# Patient Record
Sex: MALE | Race: BLACK OR AFRICAN AMERICAN
[De-identification: names, ages, dates, MRNs, and addresses within clinical notes are randomized per-mention and may not be internally consistent; named-entity substitution may affect disease eponyms.]

---

## 2019-09-29 ENCOUNTER — HOSPITAL ENCOUNTER (INPATIENT)
Dept: HOSPITAL 74 - YASAS | Age: 54
LOS: 5 days | Discharge: HOME | DRG: 897 | End: 2019-10-04
Attending: SURGERY | Admitting: SURGERY
Payer: COMMERCIAL

## 2019-09-29 VITALS — BODY MASS INDEX: 23.7 KG/M2

## 2019-09-29 DIAGNOSIS — Z85.72: ICD-10-CM

## 2019-09-29 DIAGNOSIS — I10: ICD-10-CM

## 2019-09-29 DIAGNOSIS — Z79.84: ICD-10-CM

## 2019-09-29 DIAGNOSIS — F17.210: ICD-10-CM

## 2019-09-29 DIAGNOSIS — Z21: ICD-10-CM

## 2019-09-29 DIAGNOSIS — R94.5: ICD-10-CM

## 2019-09-29 DIAGNOSIS — I42.9: ICD-10-CM

## 2019-09-29 DIAGNOSIS — R63.4: ICD-10-CM

## 2019-09-29 DIAGNOSIS — E86.0: ICD-10-CM

## 2019-09-29 DIAGNOSIS — E11.9: ICD-10-CM

## 2019-09-29 DIAGNOSIS — R00.0: ICD-10-CM

## 2019-09-29 DIAGNOSIS — D72.819: ICD-10-CM

## 2019-09-29 DIAGNOSIS — F10.230: Primary | ICD-10-CM

## 2019-09-29 PROCEDURE — HZ2ZZZZ DETOXIFICATION SERVICES FOR SUBSTANCE ABUSE TREATMENT: ICD-10-PCS | Performed by: ALLERGY & IMMUNOLOGY

## 2019-09-29 RX ADMIN — Medication SCH MG: at 22:14

## 2019-09-29 RX ADMIN — ATORVASTATIN CALCIUM SCH MG: 10 TABLET, FILM COATED ORAL at 22:14

## 2019-09-29 RX ADMIN — NICOTINE SCH MG: 21 PATCH TRANSDERMAL at 14:22

## 2019-09-29 NOTE — HP
CIWA Score


Nausea/Vomitin


Muscle Tremors: 2


Anxiety: 2


Agitation: 3


Paroxysmal Sweats: 2


Orientation: 0-Oriented


Tacttile Disturbances: 1-Very Mild Itch/Numbness


Auditory Disturbances: 0-None


Visual Disturbances: 0-None


Headache: 2-Mild


CIWA-Ar Total Score: 14





- Admission Criteria


[facility] Guidelines: Admission for Medically Managed Detox: 


Requires at least one of the followin. CIWA greater than 12


2. Seizures within the past 24 hours


3. Delirium tremens within the past 24 hours


4. Hallucinations within the past 24 hours


5. Acute intervention needed for co  occurring medical disorder


6. Acute intervention needed for co  occurring psychiatric disorder


7. Severe withdrawal that cannot be handled at a lower level of care (continued


    vomiting, continued diarrhea, abnormal vital signs) requiring intravenous


    medication and/or fluids


8. Pregnancy








Admission ROS S





- \[facility]\""


Chief Complaint: 





i am here to stop drinking alcohol


Allergies/Adverse Reactions: 


 Allergies











Allergy/AdvReac Type Severity Reaction Status Date / Time


 


No Known Allergies Allergy   Verified 19 10:42











History of Present Illness: 





this 53 years old male with alcohol dependence,seeking detox,withdrawal symptom,

last admission [facility] in ,


history of hypertension,type 2 dm,


hiv since 


denied seizure


syncope


weight loss


nicotine dependence 10 cigarette


longest sobriety 10 years


plan for out patient program after detox


non hodgkin's lymphoma since  on chemotherapy ,remission


tachcardia,cadomyopathy





Exam Limitations: No Limitations





- Ebola screening


Have you traveled outside of the country in the last 21 days: No (N)


Have you had contact with anyone from an Ebola affected area: No





- Review of Systems


Constitutional: Loss of Appetite, Malaise, Night Sweats, Changes in sleep, 

Weakness


EENT: reports: Nose Congestion


Respiratory: reports: No Symptoms reported


Cardiac: reports: No Symptoms Reported


GI: reports: Nausea, Poor Appetite, Abdominal cramping


: reports: No Symptoms Reported


Musculoskeletal: reports: Back Pain, Muscle Pain


Integumentary: reports: Dryness


Neuro: reports: Headache, Tremors


Endocrine: reports: No Symptoms Reported


Hematology: reports: No Symptoms Reported, Other (hiv)


Psychiatric: reports: No Sypmtoms Reported, Judgement Intact, Mood/Affect 

Appropiate, Orientated x3


Other Systems: Reviewed and Negative





Patient History





- Patient Medical History


Hx Anemia: No


Hx Asthma: No


Hx Chronic Obstructive Pulmonary Disease (COPD): No


Hx Cancer: No


Hx Cardiac Disorders: No


Hx Congestive Heart Failure: No


Hx Hypertension: Yes (on med)


Hx Hypercholesterolemia: No


Hx Pacemaker: No


HX Cerebrovascular Accident: No


Hx Seizures: No


Hx Dementia: No


Hx Diabetes: Yes (type2 dm)


Hx Gastrointestinal Disorders: No


Hx Liver Disease: No


Hx Genitourinary Disorders: No


Hx Sexually Transmitted Disorders: No


Hx Renal Disease (ESRD): No


Hx Thyroid Disease: No


Hx Human Immunodeficiency Virus (HIV): Yes (since )


Hx Hepatitis C: No


Hx Depression: No


Hx Suicide Attempt: No


Hx Bipolar Disorder: No


Hx Schizophrenia: No


Other Medical History: no sucidal,no homicidal,non hodgekin's lymphoma





- Patient Surgical History


Other Surgical History: portacath right chest wall and removal of portacath





- PPD History


Previous Implant?: Yes


Documented Results: Positive w/o proof


PPD to be Administered?: No





- Smoking Cessation


Smoking history: Current every day smoker


Have you smoked in the past 12 months: Yes


Aproximately how many cigarettes per day: 10


Cigars Per Day: 0


Hx Chewing Tobacco Use: No


Initiated information on smoking cessation: Yes


'Breaking Loose' booklet given: 19





- Substance & Tx. History


Hx Alcohol Use: Yes


Hx Substance Use: No


Substance Use Type: Alcohol


Hx Substance Use Treatment: Yes ([facility] 2011)





- Substances abused


  ** Alcohol


Substance route: Oral


Frequency: Daily


Amount used: 2 PINTS OF VODKA


Age of first use: 9


Date of last use: 19





Admission Physical Exam [facility]





- Vital Signs


Vital Signs: 


 Vital Signs - 24 hr











  19





  10:34


 


Temperature 98.8 F


 


Pulse Rate 103 H


 


Respiratory 20





Rate 


 


Blood Pressure 185/112 H














- Physical


General Appearance: Yes: Moderate Distress, Tremorous, Irritable, Sweating, 

Anxious


HEENTM: Yes: Normal ENT Inspection, KEY, Pharynx Normal


Respiratory: Yes: Lungs Clear, Normal Breath Sounds, No Respiratory Distress


Neck: Yes: Within Normal Limits, Supple, Trachea in good position


Breast: Yes: Within Normal Limits


Cardiology: Yes: Within Normal Limits, Regular Rhythm, Regular Rate, S1, S2


Abdominal: Yes: Within Normal Limits, Normal Bowel Sounds, Non Tender, Flat, 

Soft


Genitourinary: Yes: Within Normal Limits


Back: Yes: Muscle Spasm


Musculoskeletal: Yes: Back pain, Muscle Pain


Extremities: Yes: Tremors


Neurological: Yes: CNs II-XII NML intact, Fully Oriented, Alert, Motor Strength 

5/5


Integumentary: Yes: Dry


Lymphatic: Yes: Within Normal Limits





- Diagnostic


(1) Alcohol dependence with uncomplicated withdrawal


Current Visit: Yes   Status: Acute   





(2) Dehydration


Current Visit: Yes   Status: Acute   





(3) Non-Hodgkin lymphoma in remission


Current Visit: Yes   Status: Acute   





(4) Nicotine dependence


Current Visit: Yes   Status: Acute   





(5) Weight loss


Current Visit: Yes   Status: Acute   





(6) DM2 (diabetes mellitus, type 2)


Current Visit: Yes   Status: Acute   





(7) HIV (human immunodeficiency virus infection)


Current Visit: Yes   Status: Acute   





(8) Cardiomyopathy


Current Visit: Yes   Status: Acute   





(9) Tachycardia


Current Visit: Yes   Status: Acute   





Cleared for Admission [facility]





- Detox or Rehab


[facility] Level of Care: Medically Managed


Detox Regimen/Protocol: Librium





Screened but not Admitted





- Documentation of Visit


Screened but not Admitted: No





Breathalyzer





- Breathalyzer


Breathalyzer: 0.011





Urine Drug Screen





- Test Device


Lot number: [id number]


Expiration date: 21





- Control


Is test valid?: Yes





- Results


Drug screen NEGATIVE: Yes


Urine drug screen results: BZO-Benzodiazepines





Inpatient Rehab Admission





- Rehab Decision to Admit


Inpatient rehab admission?: No

## 2019-09-30 LAB
ALBUMIN SERPL-MCNC: 3 G/DL (ref 3.4–5)
ALP SERPL-CCNC: 71 U/L (ref 45–117)
ALT SERPL-CCNC: 35 U/L (ref 13–61)
ANION GAP SERPL CALC-SCNC: 6 MMOL/L (ref 8–16)
APPEARANCE UR: CLEAR
AST SERPL-CCNC: 45 U/L (ref 15–37)
BILIRUB SERPL-MCNC: 0.5 MG/DL (ref 0.2–1)
BILIRUB UR STRIP.AUTO-MCNC: NEGATIVE MG/DL
BUN SERPL-MCNC: 8.9 MG/DL (ref 7–18)
CALCIUM SERPL-MCNC: 9.1 MG/DL (ref 8.5–10.1)
CHLORIDE SERPL-SCNC: 103 MMOL/L (ref 98–107)
CO2 SERPL-SCNC: 31 MMOL/L (ref 21–32)
COLOR UR: (no result)
CREAT SERPL-MCNC: 0.7 MG/DL (ref 0.55–1.3)
DEPRECATED RDW RBC AUTO: 14.1 % (ref 11.9–15.9)
GLUCOSE SERPL-MCNC: 93 MG/DL (ref 74–106)
HCT VFR BLD CALC: 38 % (ref 35.4–49)
HGB BLD-MCNC: 12.5 GM/DL (ref 11.7–16.9)
KETONES UR QL STRIP: NEGATIVE
LEUKOCYTE ESTERASE UR QL STRIP.AUTO: NEGATIVE
MCH RBC QN AUTO: 30.9 PG (ref 25.7–33.7)
MCHC RBC AUTO-ENTMCNC: 33 G/DL (ref 32–35.9)
MCV RBC: 93.6 FL (ref 80–96)
NITRITE UR QL STRIP: NEGATIVE
PH UR: 7.5 [PH] (ref 5–8)
PLATELET # BLD AUTO: 143 K/MM3 (ref 134–434)
PMV BLD: 9.6 FL (ref 7.5–11.1)
POTASSIUM SERPLBLD-SCNC: 3.5 MMOL/L (ref 3.5–5.1)
PROT SERPL-MCNC: 7.9 G/DL (ref 6.4–8.2)
PROT UR QL STRIP: (no result)
PROT UR QL STRIP: NEGATIVE
RBC # BLD AUTO: 4.06 M/MM3 (ref 4–5.6)
SODIUM SERPL-SCNC: 140 MMOL/L (ref 136–145)
SP GR UR: 1.02 (ref 1.01–1.03)
UROBILINOGEN UR STRIP-MCNC: 1 MG/DL (ref 0.2–1)
WBC # BLD AUTO: 2.9 K/MM3 (ref 4–10)

## 2019-09-30 RX ADMIN — CARVEDILOL SCH MG: 6.25 TABLET, FILM COATED ORAL at 10:23

## 2019-09-30 RX ADMIN — AMIODARONE HYDROCHLORIDE SCH MG: 200 TABLET ORAL at 10:22

## 2019-09-30 RX ADMIN — NICOTINE SCH MG: 21 PATCH TRANSDERMAL at 10:23

## 2019-09-30 RX ADMIN — Medication SCH TAB: at 10:22

## 2019-09-30 RX ADMIN — METFORMIN HYDROCHLORIDE SCH: 500 TABLET ORAL at 08:08

## 2019-09-30 RX ADMIN — ATORVASTATIN CALCIUM SCH MG: 10 TABLET, FILM COATED ORAL at 22:25

## 2019-09-30 RX ADMIN — LISINOPRIL SCH MG: 10 TABLET ORAL at 10:22

## 2019-09-30 RX ADMIN — APIXABAN SCH MG: 5 TABLET, FILM COATED ORAL at 10:58

## 2019-09-30 RX ADMIN — APIXABAN SCH MG: 5 TABLET, FILM COATED ORAL at 22:25

## 2019-09-30 RX ADMIN — Medication SCH MG: at 22:25

## 2019-09-30 RX ADMIN — CARVEDILOL SCH MG: 6.25 TABLET, FILM COATED ORAL at 22:25

## 2019-09-30 NOTE — EKG
Test Reason : 

Blood Pressure : ***/*** mmHG

Vent. Rate : 089 BPM     Atrial Rate : 089 BPM

   P-R Int : 184 ms          QRS Dur : 100 ms

    QT Int : 390 ms       P-R-T Axes : 022 082 061 degrees

   QTc Int : 474 ms

 

NORMAL SINUS RHYTHM

VOLTAGE CRITERIA FOR LEFT VENTRICULAR HYPERTROPHY

ABNORMAL ECG

NO PREVIOUS ECGS AVAILABLE

Confirmed by AUGUSTUS PARRA MD ([id number]) on 9/30/2019 1:25:34 PM

 

Referred By:             Confirmed By:AUGUSTUS PARRA MD

## 2019-09-30 NOTE — PN
S CIWA





- CIWA Score


Nausea/Vomitin-Mild Nausea/No Vomiting


Muscle Tremors: 3


Anxiety: 2


Agitation: 1-Slight > Activity


Paroxysmal Sweats: 2


Orientation: 0-Oriented


Tacttile Disturbances: 1-Very Mild Itch/Numbness


Auditory Disturbances: 1-Very Mild


Visual Disturbances: 0-None


Headache: 1-Very Mild


CIWA-Ar Total Score: 12





[facility] Progress Note (SOAP)


Subjective: 





doing well with librium detox regimen


sitting on the edge of the bed eating breakfast 


alert speech clearly coherently 


discuss frequency of eliquis


writer call [facility] pharmacy at [phone]


confirmed eliquis 5 mg po bid


modify frequency of eliquis 


Objective: 





19 10:14


 Vital Signs











Temperature  98 F   19 09:11


 


Pulse Rate  94 H  19 09:11


 


Respiratory Rate  18   19 09:11


 


Blood Pressure  135/78   19 09:11


 


O2 Sat by Pulse Oximetry (%)      








 Laboratory Last Values











POC Glucometer  113 UNITS ()   19  05:50    











19 10:14


lab pending


Assessment: 





19 10:14


alcohol withdrawal sx


Plan: 





continue libirum detox regmen

## 2019-10-01 RX ADMIN — CARVEDILOL SCH MG: 6.25 TABLET, FILM COATED ORAL at 10:09

## 2019-10-01 RX ADMIN — APIXABAN SCH MG: 5 TABLET, FILM COATED ORAL at 22:15

## 2019-10-01 RX ADMIN — APIXABAN SCH MG: 5 TABLET, FILM COATED ORAL at 10:08

## 2019-10-01 RX ADMIN — LISINOPRIL SCH MG: 10 TABLET ORAL at 10:09

## 2019-10-01 RX ADMIN — Medication SCH TAB: at 10:08

## 2019-10-01 RX ADMIN — CARVEDILOL SCH MG: 6.25 TABLET, FILM COATED ORAL at 22:15

## 2019-10-01 RX ADMIN — NICOTINE SCH MG: 21 PATCH TRANSDERMAL at 10:11

## 2019-10-01 RX ADMIN — METFORMIN HYDROCHLORIDE SCH MG: 500 TABLET ORAL at 06:29

## 2019-10-01 RX ADMIN — ATORVASTATIN CALCIUM SCH MG: 10 TABLET, FILM COATED ORAL at 22:15

## 2019-10-01 RX ADMIN — Medication SCH MG: at 22:15

## 2019-10-01 RX ADMIN — AMIODARONE HYDROCHLORIDE SCH MG: 200 TABLET ORAL at 10:08

## 2019-10-01 NOTE — PN
S CIWA





- CIWA Score


Nausea/Vomitin-Mild Nausea/No Vomiting


Muscle Tremors: 1-None Visible, but Felt


Anxiety: 2


Agitation: 2


Paroxysmal Sweats: No Perspiration


Orientation: 0-Oriented


Tacttile Disturbances: 1-Very Mild Itch/Numbness


Auditory Disturbances: 0-None


Visual Disturbances: 0-None


Headache: 1-Very Mild


CIWA-Ar Total Score: 8





[facility] Progress Note (SOAP)


Subjective: 





alert,irritable,anxious,interrupted sleep,tremor


Objective: 





10/01/19 13:28


 Vital Signs











Temperature  96 F L  10/01/19 09:10


 


Pulse Rate  92 H  10/01/19 09:10


 


Respiratory Rate  20   10/01/19 09:10


 


Blood Pressure  135/91   10/01/19 09:10


 


O2 Sat by Pulse Oximetry (%)      











10/01/19 13:28


 Laboratory Last Values











WBC  2.9 K/mm3 (4.0-10.0)  L  19  08:50    


 


RBC  4.06 M/mm3 (4.00-5.60)   19  08:50    


 


Hgb  12.5 GM/dL (11.7-16.9)   19  08:50    


 


Hct  38.0 % (35.4-49)   19  08:50    


 


MCV  93.6 fl (80-96)   19  08:50    


 


MCH  30.9 pg (25.7-33.7)   19  08:50    


 


MCHC  33.0 g/dl (32.0-35.9)   19  08:50    


 


RDW  14.1 % (11.9-15.9)   19  08:50    


 


Plt Count  143 K/MM3 (134-434)   19  08:50    


 


MPV  9.6 fl (7.5-11.1)   19  08:50    


 


Sodium  140 mmol/L (136-145)   19  08:50    


 


Potassium  3.5 mmol/L (3.5-5.1)   19  08:50    


 


Chloride  103 mmol/L ()   19  08:50    


 


Carbon Dioxide  31 mmol/L (21-32)   19  08:50    


 


Anion Gap  6 MMOL/L (8-16)  L  19  08:50    


 


BUN  8.9 mg/dL (7-18)   19  08:50    


 


Creatinine  0.7 mg/dL (0.55-1.3)   19  08:50    


 


Est GFR (CKD-EPI)AfAm  123.13   19  08:50    


 


Est GFR (CKD-EPI)NonAf  106.24   19  08:50    


 


POC Glucometer  140 UNITS ()   10/01/19  05:22    


 


Random Glucose  93 mg/dL ()   19  08:50    


 


Calcium  9.1 mg/dL (8.5-10.1)   19  08:50    


 


Total Bilirubin  0.5 mg/dL (0.2-1)   19  08:50    


 


AST  45 U/L (15-37)  H  19  08:50    


 


ALT  35 U/L (13-61)   19  08:50    


 


Alkaline Phosphatase  71 U/L ()   19  08:50    


 


Total Protein  7.9 g/dl (6.4-8.2)   19  08:50    


 


Albumin  3.0 g/dl (3.4-5.0)  L  19  08:50    


 


Urine Color  Dk yellow   19  14:07    


 


Urine Appearance  Clear   19  14:07    


 


Urine pH  7.5  (5.0-8.0)   19  14:07    


 


Ur Specific Gravity  1.018  (1.010-1.035)   19  14:07    


 


Urine Protein  Trace  (NEGATIVE)   19  14:07    


 


Urine Glucose (UA)  Negative  (NEGATIVE)   19  14:07    


 


Urine Ketones  Negative  (NEGATIVE)   19  14:07    


 


Urine Blood  Negative  (NEGATIVE)   19  14:07    


 


Urine Nitrite  Negative  (NEGATIVE)   19  14:07    


 


Urine Bilirubin  Negative  (NEGATIVE)   19  14:07    


 


Urine Urobilinogen  1.0 mg/dL (0.2-1.0)   19  14:07    


 


Ur Leukocyte Esterase  Negative  (NEGATIVE)   19  14:07    


 


RPR Titer  Nonreactive  (NONREACTIVE)   19  08:50    











Assessment: 





10/01/19 13:30


withdrawal symptom


Plan: 





continue detox,encourage fluid,wbc 2,900 probably due to Hiv

## 2019-10-02 RX ADMIN — APIXABAN SCH MG: 5 TABLET, FILM COATED ORAL at 22:12

## 2019-10-02 RX ADMIN — ATORVASTATIN CALCIUM SCH MG: 10 TABLET, FILM COATED ORAL at 22:12

## 2019-10-02 RX ADMIN — CARVEDILOL SCH MG: 6.25 TABLET, FILM COATED ORAL at 22:12

## 2019-10-02 RX ADMIN — Medication SCH MG: at 22:12

## 2019-10-02 RX ADMIN — LISINOPRIL SCH MG: 10 TABLET ORAL at 10:21

## 2019-10-02 RX ADMIN — METFORMIN HYDROCHLORIDE SCH MG: 500 TABLET ORAL at 06:05

## 2019-10-02 RX ADMIN — Medication PRN MG: at 22:13

## 2019-10-02 RX ADMIN — NICOTINE SCH MG: 21 PATCH TRANSDERMAL at 10:20

## 2019-10-02 RX ADMIN — APIXABAN SCH MG: 5 TABLET, FILM COATED ORAL at 10:21

## 2019-10-02 RX ADMIN — Medication SCH TAB: at 10:20

## 2019-10-02 RX ADMIN — CARVEDILOL SCH MG: 6.25 TABLET, FILM COATED ORAL at 10:21

## 2019-10-02 RX ADMIN — AMIODARONE HYDROCHLORIDE SCH MG: 200 TABLET ORAL at 10:21

## 2019-10-02 RX ADMIN — BICTEGRAVIR SODIUM, EMTRICITABINE, AND TENOFOVIR ALAFENAMIDE FUMARATE SCH EACH: 50; 200; 25 TABLET ORAL at 15:03

## 2019-10-02 NOTE — PN
[facility] CIWA





- CIWA Score


Nausea/Vomitin-No Nausea/No Vomiting


Muscle Tremors: 1-None Visible, but Felt


Anxiety: 2


Agitation: 2


Paroxysmal Sweats: No Perspiration


Orientation: 0-Oriented


Tacttile Disturbances: 1-Very Mild Itch/Numbness


Auditory Disturbances: 0-None


Visual Disturbances: 0-None


Headache: 1-Very Mild


CIWA-Ar Total Score: 7





[facility] Progress Note (SOAP)


Subjective: 





alert,irritable,anxious,interrupted sleep


Objective: 





10/02/19 13:56


 Vital Signs











Temperature  97.3 F L  10/02/19 13:04


 


Pulse Rate  93 H  10/02/19 13:04


 


Respiratory Rate  20   10/02/19 13:04


 


Blood Pressure  123/86   10/02/19 13:04


 


O2 Sat by Pulse Oximetry (%)      











Assessment: 





10/02/19 13:56


withdrawal symptom


Plan: 





continue detox librium regimen

## 2019-10-03 RX ADMIN — NICOTINE SCH MG: 21 PATCH TRANSDERMAL at 10:22

## 2019-10-03 RX ADMIN — Medication SCH MG: at 22:23

## 2019-10-03 RX ADMIN — APIXABAN SCH MG: 5 TABLET, FILM COATED ORAL at 10:19

## 2019-10-03 RX ADMIN — LISINOPRIL SCH MG: 10 TABLET ORAL at 10:19

## 2019-10-03 RX ADMIN — Medication PRN MG: at 22:23

## 2019-10-03 RX ADMIN — Medication SCH TAB: at 10:19

## 2019-10-03 RX ADMIN — METFORMIN HYDROCHLORIDE SCH MG: 500 TABLET ORAL at 06:27

## 2019-10-03 RX ADMIN — APIXABAN SCH MG: 5 TABLET, FILM COATED ORAL at 22:23

## 2019-10-03 RX ADMIN — CARVEDILOL SCH MG: 6.25 TABLET, FILM COATED ORAL at 10:19

## 2019-10-03 RX ADMIN — CARVEDILOL SCH MG: 6.25 TABLET, FILM COATED ORAL at 22:23

## 2019-10-03 RX ADMIN — AMIODARONE HYDROCHLORIDE SCH MG: 200 TABLET ORAL at 10:20

## 2019-10-03 RX ADMIN — ATORVASTATIN CALCIUM SCH MG: 10 TABLET, FILM COATED ORAL at 22:23

## 2019-10-03 RX ADMIN — BICTEGRAVIR SODIUM, EMTRICITABINE, AND TENOFOVIR ALAFENAMIDE FUMARATE SCH EACH: 50; 200; 25 TABLET ORAL at 10:19

## 2019-10-03 NOTE — PN
S CIWA





- CIWA Score


Nausea/Vomitin-No Nausea/No Vomiting


Muscle Tremors: None


Anxiety: 0-No Anxiety, at Ease


Agitation: 2


Paroxysmal Sweats: No Perspiration


Orientation: 0-Oriented


Tacttile Disturbances: 0-None


Auditory Disturbances: 0-None


Visual Disturbances: 0-None


Headache: 0-None Present


CIWA-Ar Total Score: 2





[facility] Progress Note (SOAP)


Subjective: 





Patient denies current Withdrawal / Detox symptoms and reports that he feels 

well overall at this time.


Objective: 


PATIENT A & O X 3, OBSERVED AMBULATING ON DETOX UNIT UNASSISTED. IN NO ACUTE 

DISTRESS.





10/03/19 15:20


 Vital Signs











Temperature  97.4 F L  10/03/19 13:12


 


Pulse Rate  82   10/03/19 13:12


 


Respiratory Rate  18   10/03/19 13:12


 


Blood Pressure  132/87   10/03/19 13:12


 


O2 Sat by Pulse Oximetry (%)      








 Laboratory Tests











  19





  13:33 16:46 05:50


 


WBC   


 


RBC   


 


Hgb   


 


Hct   


 


MCV   


 


MCH   


 


MCHC   


 


RDW   


 


Plt Count   


 


MPV   


 


Sodium   


 


Potassium   


 


Chloride   


 


Carbon Dioxide   


 


Anion Gap   


 


BUN   


 


Creatinine   


 


Est GFR (CKD-EPI)AfAm   


 


Est GFR (CKD-EPI)NonAf   


 


POC Glucometer  101  190  113


 


Random Glucose   


 


Calcium   


 


Total Bilirubin   


 


AST   


 


ALT   


 


Alkaline Phosphatase   


 


Total Protein   


 


Albumin   


 


Urine Color   


 


Urine Appearance   


 


Urine pH   


 


Ur Specific Gravity   


 


Urine Protein   


 


Urine Glucose (UA)   


 


Urine Ketones   


 


Urine Blood   


 


Urine Nitrite   


 


Urine Bilirubin   


 


Urine Urobilinogen   


 


Ur Leukocyte Esterase   


 


RPR Titer   














  19





  08:50 08:50 08:50


 


WBC  2.9 L  


 


RBC  4.06  


 


Hgb  12.5  


 


Hct  38.0  


 


MCV  93.6  


 


MCH  30.9  


 


MCHC  33.0  


 


RDW  14.1  


 


Plt Count  143  


 


MPV  9.6  


 


Sodium   140 


 


Potassium   3.5 


 


Chloride   103 


 


Carbon Dioxide   31 


 


Anion Gap   6 L 


 


BUN   8.9 


 


Creatinine   0.7 


 


Est GFR (CKD-EPI)AfAm   123.13 


 


Est GFR (CKD-EPI)NonAf   106.24 


 


POC Glucometer   


 


Random Glucose   93 


 


Calcium   9.1 


 


Total Bilirubin   0.5 


 


AST   45 H 


 


ALT   35 


 


Alkaline Phosphatase   71 


 


Total Protein   7.9 


 


Albumin   3.0 L 


 


Urine Color   


 


Urine Appearance   


 


Urine pH   


 


Ur Specific Gravity   


 


Urine Protein   


 


Urine Glucose (UA)   


 


Urine Ketones   


 


Urine Blood   


 


Urine Nitrite   


 


Urine Bilirubin   


 


Urine Urobilinogen   


 


Ur Leukocyte Esterase   


 


RPR Titer    Nonreactive














  09/30/19 09/30/19 10/01/19





  14:07 16:54 05:22


 


WBC   


 


RBC   


 


Hgb   


 


Hct   


 


MCV   


 


MCH   


 


MCHC   


 


RDW   


 


Plt Count   


 


MPV   


 


Sodium   


 


Potassium   


 


Chloride   


 


Carbon Dioxide   


 


Anion Gap   


 


BUN   


 


Creatinine   


 


Est GFR (CKD-EPI)AfAm   


 


Est GFR (CKD-EPI)NonAf   


 


POC Glucometer   116  140


 


Random Glucose   


 


Calcium   


 


Total Bilirubin   


 


AST   


 


ALT   


 


Alkaline Phosphatase   


 


Total Protein   


 


Albumin   


 


Urine Color  Dk yellow  


 


Urine Appearance  Clear  


 


Urine pH  7.5  


 


Ur Specific Gravity  1.018  


 


Urine Protein  Trace  


 


Urine Glucose (UA)  Negative  


 


Urine Ketones  Negative  


 


Urine Blood  Negative  


 


Urine Nitrite  Negative  


 


Urine Bilirubin  Negative  


 


Urine Urobilinogen  1.0  


 


Ur Leukocyte Esterase  Negative  


 


RPR Titer   














  10/01/19 10/02/19 10/02/19





  16:20 05:51 16:22


 


WBC   


 


RBC   


 


Hgb   


 


Hct   


 


MCV   


 


MCH   


 


MCHC   


 


RDW   


 


Plt Count   


 


MPV   


 


Sodium   


 


Potassium   


 


Chloride   


 


Carbon Dioxide   


 


Anion Gap   


 


BUN   


 


Creatinine   


 


Est GFR (CKD-EPI)AfAm   


 


Est GFR (CKD-EPI)NonAf   


 


POC Glucometer  167  128  161


 


Random Glucose   


 


Calcium   


 


Total Bilirubin   


 


AST   


 


ALT   


 


Alkaline Phosphatase   


 


Total Protein   


 


Albumin   


 


Urine Color   


 


Urine Appearance   


 


Urine pH   


 


Ur Specific Gravity   


 


Urine Protein   


 


Urine Glucose (UA)   


 


Urine Ketones   


 


Urine Blood   


 


Urine Nitrite   


 


Urine Bilirubin   


 


Urine Urobilinogen   


 


Ur Leukocyte Esterase   


 


RPR Titer   














  10/03/19





  05:30


 


WBC 


 


RBC 


 


Hgb 


 


Hct 


 


MCV 


 


MCH 


 


MCHC 


 


RDW 


 


Plt Count 


 


MPV 


 


Sodium 


 


Potassium 


 


Chloride 


 


Carbon Dioxide 


 


Anion Gap 


 


BUN 


 


Creatinine 


 


Est GFR (CKD-EPI)AfAm 


 


Est GFR (CKD-EPI)NonAf 


 


POC Glucometer  129


 


Random Glucose 


 


Calcium 


 


Total Bilirubin 


 


AST 


 


ALT 


 


Alkaline Phosphatase 


 


Total Protein 


 


Albumin 


 


Urine Color 


 


Urine Appearance 


 


Urine pH 


 


Ur Specific Gravity 


 


Urine Protein 


 


Urine Glucose (UA) 


 


Urine Ketones 


 


Urine Blood 


 


Urine Nitrite 


 


Urine Bilirubin 


 


Urine Urobilinogen 


 


Ur Leukocyte Esterase 


 


RPR Titer 








LABS NOTED.


Assessment: 





10/03/19 15:21


WITHDRAWAL SYMPTOMS.


LEUKOPENIA.


ELEVATED AST LEVEL.


Plan: 





CONTINUE DETOX.





PATIENT SCHEDULED FOR DISCHARGE TOMORROW.

## 2019-10-04 VITALS — DIASTOLIC BLOOD PRESSURE: 81 MMHG | SYSTOLIC BLOOD PRESSURE: 124 MMHG | HEART RATE: 80 BPM | TEMPERATURE: 97.9 F

## 2019-10-04 RX ADMIN — METFORMIN HYDROCHLORIDE SCH MG: 500 TABLET ORAL at 07:50

## 2019-10-04 NOTE — DS
[facility] Detox Discharge Summary


Admission Date: 


19





Discharge Date: 10/04/19





- History


Present History: Alcohol Dependence


Additional Comments: 





PATIENT WILL ATTEND '[facility] PROGRAM' FOR AFTERCARE. PATIENT DECLINED 

OFFER OF MEDICATION PRESCRIPTION FOR HOME MEDICATION AT TIME OF DISCHARGE FROM 

DETOX, NOTING THAT HE CURRENTLY HAS ADEQUATE SUPPLIES OF ALL PRESCRIBED HOME 

MEDICATIONS AT HOME. PATIENT WAS DISCHARGED FROM DETOX UNIT IN STABLE MEDICAL 

CONDITION.


Pertinent Past History: 





H.I.V., HTN, History Of Syncope, History Of Weight Loss, History Of Non-Hodgkin'

s Lymphoma, Type II DM, History Of Cardiomyopathy, History Of Tachycardia, 

Leukopenia, Elevated AST Level, Dehydration.





- Physical Exam Results


Vital Signs: 


 Vital Signs











Temperature  97.9 F   10/04/19 06:00


 


Pulse Rate  80   10/04/19 06:00


 


Respiratory Rate  18   10/04/19 06:00


 


Blood Pressure  124/81   10/04/19 06:00


 


O2 Sat by Pulse Oximetry (%)      











Pertinent Admission Physical Exam Findings: 





WITHDRAWAL SYMPTOMS.





 Laboratory Tests











  19





  13:33 16:46 05:50


 


WBC   


 


RBC   


 


Hgb   


 


Hct   


 


MCV   


 


MCH   


 


MCHC   


 


RDW   


 


Plt Count   


 


MPV   


 


Sodium   


 


Potassium   


 


Chloride   


 


Carbon Dioxide   


 


Anion Gap   


 


BUN   


 


Creatinine   


 


Est GFR (CKD-EPI)AfAm   


 


Est GFR (CKD-EPI)NonAf   


 


POC Glucometer  101  190  113


 


Random Glucose   


 


Calcium   


 


Total Bilirubin   


 


AST   


 


ALT   


 


Alkaline Phosphatase   


 


Total Protein   


 


Albumin   


 


Urine Color   


 


Urine Appearance   


 


Urine pH   


 


Ur Specific Gravity   


 


Urine Protein   


 


Urine Glucose (UA)   


 


Urine Ketones   


 


Urine Blood   


 


Urine Nitrite   


 


Urine Bilirubin   


 


Urine Urobilinogen   


 


Ur Leukocyte Esterase   


 


RPR Titer   














  19





  08:50 08:50 08:50


 


WBC  2.9 L  


 


RBC  4.06  


 


Hgb  12.5  


 


Hct  38.0  


 


MCV  93.6  


 


MCH  30.9  


 


MCHC  33.0  


 


RDW  14.1  


 


Plt Count  143  


 


MPV  9.6  


 


Sodium   140 


 


Potassium   3.5 


 


Chloride   103 


 


Carbon Dioxide   31 


 


Anion Gap   6 L 


 


BUN   8.9 


 


Creatinine   0.7 


 


Est GFR (CKD-EPI)AfAm   123.13 


 


Est GFR (CKD-EPI)NonAf   106.24 


 


POC Glucometer   


 


Random Glucose   93 


 


Calcium   9.1 


 


Total Bilirubin   0.5 


 


AST   45 H 


 


ALT   35 


 


Alkaline Phosphatase   71 


 


Total Protein   7.9 


 


Albumin   3.0 L 


 


Urine Color   


 


Urine Appearance   


 


Urine pH   


 


Ur Specific Gravity   


 


Urine Protein   


 


Urine Glucose (UA)   


 


Urine Ketones   


 


Urine Blood   


 


Urine Nitrite   


 


Urine Bilirubin   


 


Urine Urobilinogen   


 


Ur Leukocyte Esterase   


 


RPR Titer    Nonreactive














  09/30/19 09/30/19 10/01/19





  14:07 16:54 05:22


 


WBC   


 


RBC   


 


Hgb   


 


Hct   


 


MCV   


 


MCH   


 


MCHC   


 


RDW   


 


Plt Count   


 


MPV   


 


Sodium   


 


Potassium   


 


Chloride   


 


Carbon Dioxide   


 


Anion Gap   


 


BUN   


 


Creatinine   


 


Est GFR (CKD-EPI)AfAm   


 


Est GFR (CKD-EPI)NonAf   


 


POC Glucometer   116  140


 


Random Glucose   


 


Calcium   


 


Total Bilirubin   


 


AST   


 


ALT   


 


Alkaline Phosphatase   


 


Total Protein   


 


Albumin   


 


Urine Color  Dk yellow  


 


Urine Appearance  Clear  


 


Urine pH  7.5  


 


Ur Specific Gravity  1.018  


 


Urine Protein  Trace  


 


Urine Glucose (UA)  Negative  


 


Urine Ketones  Negative  


 


Urine Blood  Negative  


 


Urine Nitrite  Negative  


 


Urine Bilirubin  Negative  


 


Urine Urobilinogen  1.0  


 


Ur Leukocyte Esterase  Negative  


 


RPR Titer   














  10/01/19 10/02/19 10/02/19





  16:20 05:51 16:22


 


WBC   


 


RBC   


 


Hgb   


 


Hct   


 


MCV   


 


MCH   


 


MCHC   


 


RDW   


 


Plt Count   


 


MPV   


 


Sodium   


 


Potassium   


 


Chloride   


 


Carbon Dioxide   


 


Anion Gap   


 


BUN   


 


Creatinine   


 


Est GFR (CKD-EPI)AfAm   


 


Est GFR (CKD-EPI)NonAf   


 


POC Glucometer  167  128  161


 


Random Glucose   


 


Calcium   


 


Total Bilirubin   


 


AST   


 


ALT   


 


Alkaline Phosphatase   


 


Total Protein   


 


Albumin   


 


Urine Color   


 


Urine Appearance   


 


Urine pH   


 


Ur Specific Gravity   


 


Urine Protein   


 


Urine Glucose (UA)   


 


Urine Ketones   


 


Urine Blood   


 


Urine Nitrite   


 


Urine Bilirubin   


 


Urine Urobilinogen   


 


Ur Leukocyte Esterase   


 


RPR Titer   














  10/03/19 10/03/19 10/04/19





  05:30 16:26 05:33


 


WBC   


 


RBC   


 


Hgb   


 


Hct   


 


MCV   


 


MCH   


 


MCHC   


 


RDW   


 


Plt Count   


 


MPV   


 


Sodium   


 


Potassium   


 


Chloride   


 


Carbon Dioxide   


 


Anion Gap   


 


BUN   


 


Creatinine   


 


Est GFR (CKD-EPI)AfAm   


 


Est GFR (CKD-EPI)NonAf   


 


POC Glucometer  129  146  166


 


Random Glucose   


 


Calcium   


 


Total Bilirubin   


 


AST   


 


ALT   


 


Alkaline Phosphatase   


 


Total Protein   


 


Albumin   


 


Urine Color   


 


Urine Appearance   


 


Urine pH   


 


Ur Specific Gravity   


 


Urine Protein   


 


Urine Glucose (UA)   


 


Urine Ketones   


 


Urine Blood   


 


Urine Nitrite   


 


Urine Bilirubin   


 


Urine Urobilinogen   


 


Ur Leukocyte Esterase   


 


RPR Titer   








LABS NOTED.





- Treatment


Hospital Course: Detox Protocol Followed, Detoxed Safely, Responded well, 

Discharged Condition Good


Patient has Accepted a Rehab Referral to: PATIENT TO ATTEND [facility] 

PROGRAM FOR AFTERCARE.





- Medication


Discharge Medications: 


Ambulatory Orders





Amiodarone HCl 200 mg PO DAILY 19 


Bictegrav/Emtricit/Tenofov Ala [Biktarvy -25 mg Tablet] 1 each PO DAILY  


Carvedilol [Coreg -] 6.25 mg PO BID 19 


Lisinopril [Zestril] 10 mg PO DAILY 19 


Metformin HCl [Glucophage] 500 mg PO DAILY 19 


Simvastatin 20 mg PO DAILY 19 


Apixaban [Eliquis -] 5 mg PO BID 19 











- Diagnosis


(1) Alcohol dependence with uncomplicated withdrawal


Status: Acute   





(2) Cardiomyopathy


Status: Acute   


Qualifiers: 


   Cardiomyopathy type: unspecified   Qualified Code(s): I42.9 - Cardiomyopathy

, unspecified   





(3) DM2 (diabetes mellitus, type 2)


Status: Acute   


Qualifiers: 


   Diabetes mellitus long term insulin use: without long term use   Diabetes 

mellitus complication status: with other specified complication   Qualified Code

(s): E11.69 - Type 2 diabetes mellitus with other specified complication   





(4) Dehydration


Status: Acute   





(5) Elevated aspartate aminotransferase level


Status: Acute   





(6) HIV (human immunodeficiency virus infection)


Status: Acute   


Qualifiers: 


   HIV symptom status: unspecified   Qualified Code(s): B20 - Human 

immunodeficiency virus [HIV] disease   





(7) Leukopenia


Status: Acute   


Qualifiers: 


   Leukopenia type: unspecified   Qualified Code(s): D72.819 - Decreased white 

blood cell count, unspecified   





(8) Nicotine dependence


Status: Acute   


Qualifiers: 


   Nicotine product type: cigarettes   Substance use status: uncomplicated   

Qualified Code(s): F17.210 - Nicotine dependence, cigarettes, uncomplicated   





(9) Non-Hodgkin lymphoma in remission


Status: Chronic   





(10) Tachycardia


Status: Chronic   





(11) Weight loss


Status: Acute   





- AMA


Did Patient Leave Against Medical Advice: No





[facility] CIWA





- CIWA Score


Nausea/Vomitin-No Nausea/No Vomiting


Muscle Tremors: None


Anxiety: 0-No Anxiety, at Ease


Agitation: 0-Normal Activity


Paroxysmal Sweats: No Perspiration


Orientation: 0-Oriented


Tacttile Disturbances: 0-None


Auditory Disturbances: 0-None


Visual Disturbances: 0-None


Headache: 0-None Present


CIWA-Ar Total Score: 0